# Patient Record
Sex: FEMALE | Race: BLACK OR AFRICAN AMERICAN | NOT HISPANIC OR LATINO | Employment: FULL TIME | URBAN - METROPOLITAN AREA
[De-identification: names, ages, dates, MRNs, and addresses within clinical notes are randomized per-mention and may not be internally consistent; named-entity substitution may affect disease eponyms.]

---

## 2017-01-20 ENCOUNTER — HOSPITAL ENCOUNTER (EMERGENCY)
Facility: HOSPITAL | Age: 27
Discharge: HOME | End: 2017-01-20
Attending: EMERGENCY MEDICINE
Payer: COMMERCIAL

## 2017-01-20 VITALS
WEIGHT: 155 LBS | TEMPERATURE: 98 F | HEART RATE: 87 BPM | HEIGHT: 60 IN | RESPIRATION RATE: 18 BRPM | SYSTOLIC BLOOD PRESSURE: 130 MMHG | BODY MASS INDEX: 30.43 KG/M2 | OXYGEN SATURATION: 99 % | DIASTOLIC BLOOD PRESSURE: 68 MMHG

## 2017-01-20 DIAGNOSIS — B02.8: Primary | ICD-10-CM

## 2017-01-20 LAB
B-HCG UR QL: NEGATIVE
CTP QC/QA: YES

## 2017-01-20 PROCEDURE — 25000003 PHARM REV CODE 250: Performed by: PHYSICIAN ASSISTANT

## 2017-01-20 PROCEDURE — 99284 EMERGENCY DEPT VISIT MOD MDM: CPT | Mod: 25

## 2017-01-20 PROCEDURE — 81025 URINE PREGNANCY TEST: CPT

## 2017-01-20 RX ORDER — GABAPENTIN 100 MG/1
100 CAPSULE ORAL 3 TIMES DAILY
Qty: 30 CAPSULE | Refills: 0 | Status: SHIPPED | OUTPATIENT
Start: 2017-01-20 | End: 2018-07-05

## 2017-01-20 RX ORDER — VALACYCLOVIR HYDROCHLORIDE 1 G/1
1000 TABLET, FILM COATED ORAL 3 TIMES DAILY
Qty: 21 TABLET | Refills: 0 | Status: SHIPPED | OUTPATIENT
Start: 2017-01-20 | End: 2018-07-05

## 2017-01-20 RX ORDER — HYDROCODONE BITARTRATE AND ACETAMINOPHEN 5; 325 MG/1; MG/1
1 TABLET ORAL EVERY 4 HOURS PRN
Qty: 12 TABLET | Refills: 0 | Status: SHIPPED | OUTPATIENT
Start: 2017-01-20 | End: 2017-01-30

## 2017-01-20 RX ORDER — PROPARACAINE HYDROCHLORIDE 5 MG/ML
2 SOLUTION/ DROPS OPHTHALMIC
Status: COMPLETED | OUTPATIENT
Start: 2017-01-20 | End: 2017-01-20

## 2017-01-20 RX ADMIN — PROPARACAINE HYDROCHLORIDE 2 DROP: 5 SOLUTION/ DROPS OPHTHALMIC at 10:01

## 2017-01-20 RX ADMIN — FLUORESCEIN SODIUM 1 STRIP: 1 STRIP OPHTHALMIC at 10:01

## 2017-01-20 NOTE — ED TRIAGE NOTES
Pt presented to ED with complaints of left eye pain rated 6/10 since Tuesday and rash to eye. Few small bumps noted to left side close to bridge of nose. Pt also stated that she noticed small bump to lip this am

## 2017-01-20 NOTE — ED AVS SNAPSHOT
[facility]  [address]  [place] [address]               Malissa Bunch   2017 10:00 AM   ED    Description:  Female : 1990   Department:  [facility]           Your Care was Coordinated By:     Provider Role From To    Yas Smith MD Attending Provider 17 [phone] --    DANNY Claire Physician Assistant 17 [phone] --      Reason for Visit     Eye Problem           Diagnoses this Visit        Comments    Herpes zoster with other specified complications    -  Primary       ED Disposition     ED Disposition Condition Comment    Discharge             To Do List           Follow-up Information     Follow up with Marilou Bernal MD.    Specialty:  Ophthalmology    Contact information:    [address]  [address]  [facility] [address] [phone]          Schedule an appointment as soon as possible for a visit with Trino Ron - Ophthalmology.    Specialty:  Ophthalmology    Contact information:    [address]  [facility] [address] [phone]    Additional information:    10th Floor    Dr. Kaplan patients please go to the 1st Floor [facility] for your appointment.        These Medications        Disp Refills Start End    valacyclovir (VALTREX) 1000 MG tablet 21 tablet 0 2017    Take 1 tablet (1,000 mg total) by mouth 3 (three) times daily. - Oral    hydrocodone-acetaminophen 5-325mg (NORCO) 5-325 mg per tablet 12 tablet 0 2017    Take 1 tablet by mouth every 4 (four) hours as needed. - Oral    gabapentin (NEURONTIN) 100 MG capsule 30 capsule 0 2017    Take 1 capsule (100 mg total) by mouth 3 (three) times daily. - Oral      [facility]     [facility] On Call Nurse Care Line -  Assistance  Registered nurses in the [facility] On Call Center provide clinical advisement, health education, appointment booking, and other advisory services.  Call for this free  service at [phone].             Medications           Message regarding Medications     Verify the changes and/or additions to your medication regime listed below are the same as discussed with your clinician today.  If any of these changes or additions are incorrect, please notify your healthcare provider.        START taking these NEW medications        Refills    valacyclovir (VALTREX) 1000 MG tablet 0    Sig: Take 1 tablet (1,000 mg total) by mouth 3 (three) times daily.    Class: Print    Route: Oral    hydrocodone-acetaminophen 5-325mg (NORCO) 5-325 mg per tablet 0    Sig: Take 1 tablet by mouth every 4 (four) hours as needed.    Class: Print    Route: Oral    gabapentin (NEURONTIN) 100 MG capsule 0    Sig: Take 1 capsule (100 mg total) by mouth 3 (three) times daily.    Class: Print    Route: Oral      These medications were administered today        Dose Freq    fluorescein ophthalmic strip 1 strip 1 strip ED 1 Time    Sig: Place 1 strip into the left eye ED 1 Time.    Class: Normal    Route: Left Eye    Cosign for Ordering: Required by Yas Smith MD    proparacaine 0.5 % ophthalmic solution 2 drop 2 drop ED 1 Time    Sig: Place 2 drops into the left eye ED 1 Time.    Class: Normal    Route: Left Eye    Cosign for Ordering: Required by Yas Smith MD           Verify that the below list of medications is an accurate representation of the medications you are currently taking.  If none reported, the list may be blank. If incorrect, please contact your healthcare provider. Carry this list with you in case of emergency.           Current Medications     gabapentin (NEURONTIN) 100 MG capsule Take 1 capsule (100 mg total) by mouth 3 (three) times daily.    hydrocodone-acetaminophen 5-325mg (NORCO) 5-325 mg per tablet Take 1 tablet by mouth every 4 (four) hours as needed.    valacyclovir (VALTREX) 1000 MG tablet Take 1 tablet (1,000 mg total) by mouth 3 (three) times daily.           Clinical Reference  Information           Your Vitals Were     BP Pulse Temp Resp Height Weight    130/68 87 98.4 °F (36.9 °C) 18 5' (1.524 m) 70.3 kg (155 lb)    SpO2 BMI             99% 30.27 kg/m2         Allergies as of 1/20/2017     No Known Allergies      Immunizations Administered on Date of Encounter - 1/20/2017     None      ED Micro, Lab, POCT     Start Ordered       Status Ordering Provider    01/20/17 0000 01/20/17 1017  POCT urine pregnancy     Comments:  This order was created through External Result Entry    Completed       ED Imaging Orders     None        Discharge Instructions         Shingles (Herpes Zoster)  Shingles is also called herpes zoster. It is a painful skin rash caused by the herpes zoster virus. This is the same virus that causes chickenpox. After a person has chickenpox, the virus remains inactive in the nerve cells. Years later, the virus can become active again and travel to the skin. Most people have shingles only once, but it is possible to have it more than once.  What are the risk factors for shingles?  Anyone who has had chickenpox can develop shingles. But your risk is greater if you:  · Are 50 years of age or older.  · Have an illness that weakens your immune system, such as HIV/AIDS.  · Have cancer, especially Hodgkin disease or lymphoma.  · Take medications that weaken your immune system.  What are the symptoms of shingles?     The shingles rash usually appears on just one side of the body.   · The first sign of shingles is usually pain, burning, tingling, or itching on one part of your face or body. You may also feel as if you have the flu, with fever and chills.  · A red rash with small blisters appears within a few days. The rash may appear as follows:   ¨  The blisters can occur anywhere, but theyre most common on the back, chest, or abdomen.  ¨ They usually appear on only one side of the body, spreading along the nerve pathway where the virus was inactive.   ¨ The rash can also form  around an eye, along one side of the face or neck, or in the mouth.  ¨ In a few people, usually those with weakened immune systems, shingles appear on more than one part of the body at once.  · After a few days, the blisters become dry and form a crust. The crust falls off in days to weeks. The blisters generally do not leave scars.  How is shingles treated?  For most people, shingles heals on its own in a few weeks. But treatment is recommended to help relieve pain, speed healing, and reduce the risk of complications. Antiviral medications are prescribed within the first 72 hours of the appearance of the rash. To lessen symptoms:  · Apply ice packs (wrapped in a thin towel), cool compresses,  or soak in a cool bath.  · Use calamine lotion to calm itchy skin.  · Ask your health care provider about over-the-counter pain relievers. If your pain is severe, your provider may prescribe stronger pain medications.  What are the complications of shingles?  Shingles often goes away with no lasting effects. But some people have serious problems long after the blisters have healed:  · Postherpetic neuralgia. This is severe nerve pain that lasts for months, or even years after you have shingles. Medications can be prescribed to help relieve the pain and improve quality of life.  · Bacterial infection. Shingles blisters may become infected with bacteria. Antibiotic medication is used to treat the infection.  · Eye problems. A person with shingles on the face should see his or her health care provider right away. Shingles can cause serious problems with vision, and even blindness.  When to seek medical care  Contact your health care provider if you experience any of the following:  · Symptoms that dont go away with treatment.  · A rash or blisters near your eye.  · Increased drainage, fever, or rash after treatment, or severe pain that doesnt go away.   How can shingles be prevented?  You can only get shingles if you have had  chicken pox in the past. Those who have never had chickenpox can get the virus from you. Although instead of developing shingles, the person may get chickenpox. Until your blisters form scabs, avoid contact with others, especially the following:  · Pregnant women who have never had chickenpox or the vaccine  · Infants who were born early (prematurely) or who had low weight at birth  · People with weak immune system (for example, people receiving chemotherapy for cancer, people who have had organ transplants, or people with HIV infections)     The shingles vaccine  If youre 60 years of age or older , ask your health care provider if you should receive the shingles vaccine. The vaccine makes it less likely that you will develop shingles. If you do develop shingles, your symptoms will likely be milder than if you hadnt been vaccinated. Note: Although the vaccine is licensed for people 50 years of age or older, the CDC does not recommend the vaccine for those who are 50 to 59 years old.   © 2257-1078 [employer] [address] [place] [address]. All rights reserved. This information is not intended as a substitute for professional medical care. Always follow your healthcare professional's instructions.           [facility] complies with applicable Federal civil rights laws and does not discriminate on the basis of race, color, national origin, age, disability, or sex.        Language Assistance Services     ATTENTION: Language assistance services are available, free of charge. Please call [phone].      ATENCIÓN: Si habla krunal, tiene a buchanan disposición servicios gratuitos de asistencia lingüística. Llame al [phone].     CHÚ Ý: N?u b?n nói Ti?ng Vi?t, có các d?ch v? h? tr? ngôn ng? mi?n phí dành cho b?n. G?i s? [phone].

## 2017-01-20 NOTE — ED PROVIDER NOTES
Encounter Date: 1/20/2017       History     Chief Complaint   Patient presents with    Eye Problem     left eye irritation, denies visual disturbance     Review of patient's allergies indicates:  No Known Allergies  HPI Comments: Malissa Bunch 26 y.o. nontoxic/afebrile female with no reported PMH  presented to the ED with C/o left facial pain for the past few days. She states that pain was initially a mild discomfit that has gradually worsened. She reports that today she noted painful clear blisters to the affected area. She denies any vision changes, vision loss, or eye pain. She denies any fever, chills, ear pain, ear drainage, neck pain or other locations at this time.  She does report having chicken pox as a child. She denies truing any medications for the symptoms.     The history is provided by the patient.     History reviewed. No pertinent past medical history.  No past medical history pertinent negatives.  Past Surgical History   Procedure Laterality Date    Scoliosis sx       History reviewed. No pertinent family history.  Social History   Substance Use Topics    Smoking status: Never Smoker    Smokeless tobacco: None    Alcohol use Yes     Review of Systems   Constitutional: Negative for activity change, appetite change, chills and fever.   HENT: Negative for congestion, postnasal drip, rhinorrhea, sinus pressure and sore throat.    Eyes: Negative for photophobia, pain, discharge, redness and visual disturbance.   Respiratory: Negative for shortness of breath.    Cardiovascular: Negative for chest pain.   Gastrointestinal: Negative for nausea and vomiting.   Genitourinary: Negative for dysuria.   Musculoskeletal: Negative for arthralgias, back pain, myalgias, neck pain and neck stiffness.   Skin: Positive for rash.        Painful rash to the left upper eyelid, left cheek and left upper lip   Neurological: Negative for dizziness, weakness, light-headedness, numbness and headaches.   Hematological:  Does not bruise/bleed easily.       Physical Exam   Initial Vitals   BP Pulse Resp Temp SpO2   01/20/17 0930 01/20/17 0930 01/20/17 0930 01/20/17 0930 01/20/17 0930   130/68 87 18 98.4 °F (36.9 °C) 99 %     Physical Exam    Nursing note and vitals reviewed.  Constitutional: Vital signs are normal. She appears well-developed and well-nourished. She is cooperative.  Non-toxic appearance. She does not appear ill. No distress.   HENT:   Head: Normocephalic and atraumatic.       Right Ear: Tympanic membrane and ear canal normal.   Left Ear: Tympanic membrane and ear canal normal.   Mouth/Throat: Oropharynx is clear and moist and mucous membranes are normal.   Sporadic vesicular rash in dermatomal distribution to the left V2 region of the trigeminal nerve.  No rodriguez hunt syndrome or occular involvement at this time.    Eyes: Conjunctivae, EOM and lids are normal. Pupils are equal, round, and reactive to light.   Slit lamp exam:       The left eye shows no fluorescein uptake.   No dendrites noted   Neck: Neck supple. No rigidity.   Cardiovascular: Normal rate and regular rhythm.   Pulmonary/Chest: Breath sounds normal. No respiratory distress. She has no wheezes. She has no rhonchi.   Abdominal: Soft. Normal appearance and bowel sounds are normal. There is no tenderness. There is no rigidity and no guarding.   Musculoskeletal: Normal range of motion.   Neurological: She is alert and oriented to person, place, and time. No sensory deficit. Gait normal. GCS eye subscore is 4. GCS verbal subscore is 5. GCS motor subscore is 6.   Skin: Skin is warm, dry and intact. Rash noted. Rash is vesicular.   Psychiatric: She has a normal mood and affect. Her speech is normal and behavior is normal. Thought content normal.         ED Course   Procedures  Labs Reviewed - No data to display     Malissa Bunch 26 y.o. nontoxic/afebrile female with no reported PMH  presented to the ED with C/o left facial pain for the past few days. She  states that pain was initially a mild discomfit that has gradually worsened. She reports that today she noted painful clear blisters to the affected area. She denies any vision changes, vision loss, or eye pain. She denies any fever, chills, ear pain, ear drainage, neck pain or other locations at this time.  She does report having chicken pox as a child. She denies truing any medications for the symptoms.  ROS positive for rash to the left face.  Physical exam reveals patient well appearing in no obvious distress. Sporadic vesicular rash in dermatomal distribution to the left V2 region of the trigeminal nerve.  No rodriguez hunt syndrome or occular involvement at this time. Ear canal and TM's normal, PERRL, EOMs intact. FROm of neck and all extremities with strength 5.5 bilaterally.     DDX: herpes zoster, herpes ocularis    ED management: remains well appearing and will send home with valtrex and pain control. Eye stained in the ED with no abnormality at this time. Vision intact    Impression/Plan: The encounter diagnosis was Herpes zoster with other specified complications. Discharged with valtrex, norco and Neurontin. Patient will follow up with Primary.  Patient cautioned on when to return to ED.  Pt. Understands and agrees with current treatment plan                    Attending Attestation:     Physician Attestation Statement for NP/PA:   I have conducted a face to face encounter with this patient in addition to the NP/PA, due to    Other NP/PA Attestation Additions:      Medical Decision Making: Patient presents with new onset shingles to her face.  She has new vesicular lesions in the V2 distribution.  There is no eye involvement at this time.  The patient will be discharged with prescriptions for Valtrex and gabapentin and Norco.  She will follow up with ophthalmology if she starts having eye pain                 ED Course     Clinical Impression:   The encounter diagnosis was Herpes zoster with other  specified complications.          DANNY Claire  01/20/17 1622       Yas Smith MD  01/20/17 8632

## 2017-01-20 NOTE — DISCHARGE INSTRUCTIONS
Shingles (Herpes Zoster)  Shingles is also called herpes zoster. It is a painful skin rash caused by the herpes zoster virus. This is the same virus that causes chickenpox. After a person has chickenpox, the virus remains inactive in the nerve cells. Years later, the virus can become active again and travel to the skin. Most people have shingles only once, but it is possible to have it more than once.  What are the risk factors for shingles?  Anyone who has had chickenpox can develop shingles. But your risk is greater if you:  · Are 50 years of age or older.  · Have an illness that weakens your immune system, such as HIV/AIDS.  · Have cancer, especially Hodgkin disease or lymphoma.  · Take medications that weaken your immune system.  What are the symptoms of shingles?     The shingles rash usually appears on just one side of the body.   · The first sign of shingles is usually pain, burning, tingling, or itching on one part of your face or body. You may also feel as if you have the flu, with fever and chills.  · A red rash with small blisters appears within a few days. The rash may appear as follows:   ¨  The blisters can occur anywhere, but theyre most common on the back, chest, or abdomen.  ¨ They usually appear on only one side of the body, spreading along the nerve pathway where the virus was inactive.   ¨ The rash can also form around an eye, along one side of the face or neck, or in the mouth.  ¨ In a few people, usually those with weakened immune systems, shingles appear on more than one part of the body at once.  · After a few days, the blisters become dry and form a crust. The crust falls off in days to weeks. The blisters generally do not leave scars.  How is shingles treated?  For most people, shingles heals on its own in a few weeks. But treatment is recommended to help relieve pain, speed healing, and reduce the risk of complications. Antiviral medications are prescribed within the first 72 hours of  the appearance of the rash. To lessen symptoms:  · Apply ice packs (wrapped in a thin towel), cool compresses,  or soak in a cool bath.  · Use calamine lotion to calm itchy skin.  · Ask your health care provider about over-the-counter pain relievers. If your pain is severe, your provider may prescribe stronger pain medications.  What are the complications of shingles?  Shingles often goes away with no lasting effects. But some people have serious problems long after the blisters have healed:  · Postherpetic neuralgia. This is severe nerve pain that lasts for months, or even years after you have shingles. Medications can be prescribed to help relieve the pain and improve quality of life.  · Bacterial infection. Shingles blisters may become infected with bacteria. Antibiotic medication is used to treat the infection.  · Eye problems. A person with shingles on the face should see his or her health care provider right away. Shingles can cause serious problems with vision, and even blindness.  When to seek medical care  Contact your health care provider if you experience any of the following:  · Symptoms that dont go away with treatment.  · A rash or blisters near your eye.  · Increased drainage, fever, or rash after treatment, or severe pain that doesnt go away.   How can shingles be prevented?  You can only get shingles if you have had chicken pox in the past. Those who have never had chickenpox can get the virus from you. Although instead of developing shingles, the person may get chickenpox. Until your blisters form scabs, avoid contact with others, especially the following:  · Pregnant women who have never had chickenpox or the vaccine  · Infants who were born early (prematurely) or who had low weight at birth  · People with weak immune system (for example, people receiving chemotherapy for cancer, people who have had organ transplants, or people with HIV infections)     The shingles vaccine  If youre 60 years of  age or older , ask your health care provider if you should receive the shingles vaccine. The vaccine makes it less likely that you will develop shingles. If you do develop shingles, your symptoms will likely be milder than if you hadnt been vaccinated. Note: Although the vaccine is licensed for people 50 years of age or older, the CDC does not recommend the vaccine for those who are 50 to 59 years old.   © 9661-0454 [employer] [address] [place] [address], [place] [address]. All rights reserved. This information is not intended as a substitute for professional medical care. Always follow your healthcare professional's instructions.

## 2018-07-03 ENCOUNTER — PATIENT MESSAGE (OUTPATIENT)
Dept: UROLOGY | Facility: CLINIC | Age: 28
End: 2018-07-03

## 2018-07-03 ENCOUNTER — OFFICE VISIT (OUTPATIENT)
Dept: UROLOGY | Facility: CLINIC | Age: 28
End: 2018-07-03
Payer: MEDICAID

## 2018-07-03 VITALS
BODY MASS INDEX: 30.43 KG/M2 | SYSTOLIC BLOOD PRESSURE: 97 MMHG | WEIGHT: 155 LBS | HEART RATE: 75 BPM | OXYGEN SATURATION: 98 % | DIASTOLIC BLOOD PRESSURE: 65 MMHG | RESPIRATION RATE: 17 BRPM | HEIGHT: 60 IN

## 2018-07-03 DIAGNOSIS — N39.41 URGE INCONTINENCE: Primary | ICD-10-CM

## 2018-07-03 DIAGNOSIS — Z87.440 HISTORY OF RECURRENT UTI (URINARY TRACT INFECTION): ICD-10-CM

## 2018-07-03 DIAGNOSIS — R35.1 NOCTURIA: ICD-10-CM

## 2018-07-03 DIAGNOSIS — R39.15 URINARY URGENCY: ICD-10-CM

## 2018-07-03 LAB
BILIRUB SERPL-MCNC: NORMAL MG/DL
BLOOD URINE, POC: NORMAL
COLOR, POC UA: YELLOW
GLUCOSE UR QL STRIP: NORMAL
KETONES UR QL STRIP: NORMAL
LEUKOCYTE ESTERASE URINE, POC: NORMAL
NITRITE, POC UA: NORMAL
PH, POC UA: 6
PROTEIN, POC: NORMAL
SPECIFIC GRAVITY, POC UA: 1.03
UROBILINOGEN, POC UA: 0.2

## 2018-07-03 PROCEDURE — 99999 PR PBB SHADOW E&M-EST. PATIENT-LVL IV: CPT | Mod: PBBFAC,,, | Performed by: NURSE PRACTITIONER

## 2018-07-03 PROCEDURE — 99204 OFFICE O/P NEW MOD 45 MIN: CPT | Mod: S$PBB,,, | Performed by: NURSE PRACTITIONER

## 2018-07-03 PROCEDURE — 81002 URINALYSIS NONAUTO W/O SCOPE: CPT | Mod: PBBFAC,PO | Performed by: NURSE PRACTITIONER

## 2018-07-03 PROCEDURE — 99214 OFFICE O/P EST MOD 30 MIN: CPT | Mod: PBBFAC,PO | Performed by: NURSE PRACTITIONER

## 2018-07-03 RX ORDER — SOLIFENACIN SUCCINATE 5 MG/1
5 TABLET, FILM COATED ORAL DAILY
Qty: 60 TABLET | Refills: 0 | Status: SHIPPED | OUTPATIENT
Start: 2018-07-03 | End: 2018-07-03 | Stop reason: ALTCHOICE

## 2018-07-03 RX ORDER — OXYBUTYNIN CHLORIDE 5 MG/1
5 TABLET, EXTENDED RELEASE ORAL DAILY
Qty: 30 TABLET | Refills: 1 | Status: SHIPPED | OUTPATIENT
Start: 2018-07-03 | End: 2018-08-14 | Stop reason: SDUPTHER

## 2018-07-03 NOTE — PATIENT INSTRUCTIONS
1. Urine dipstick  2. Start trial of oxybutynin 5 mg daily  3. May consider urodynamics in the future with Dr. Clark if medication therapy is unsuccessful.   4. Follow-up in 6 weeks for re-evaluation

## 2018-07-03 NOTE — PROGRESS NOTES
Subjective:       Patient ID: Malissa Bunch is a 27 y.o. female.    Chief Complaint: Urinary Urgency (and urge incontinence)    Patient is a 26 yo AAF who is new to me. She is here today for urinary urgency and urge incontinence. Patient has a history of recurrent UTIs. She reports a recent UTI last month and was treated with bactrim ds. Patient does not have any children, but has been pregnant once (miscarriage).      Other   This is a chronic (Urge incontinence/OAB) problem. Episode onset: 1 year ago. The problem occurs daily. The problem has been unchanged. Associated symptoms include headaches and urinary symptoms (urge incontinence/OAB, nocturia x2). Pertinent negatives include no abdominal pain, anorexia, arthralgias, change in bowel habit, chest pain, chills, congestion, coughing, diaphoresis, fatigue, fever, myalgias, nausea, neck pain, numbness, rash, sore throat, swollen glands, vertigo, visual change, vomiting or weakness. Nothing aggravates the symptoms. She has tried nothing for the symptoms.     Review of Systems   Constitutional: Negative for appetite change, chills, diaphoresis, fatigue and fever.   HENT: Negative for congestion and sore throat.    Respiratory: Negative for cough.    Cardiovascular: Negative for chest pain.   Gastrointestinal: Negative for abdominal pain, anorexia, blood in stool, change in bowel habit, constipation, diarrhea, nausea and vomiting.   Genitourinary: Positive for pelvic pain and urgency. Negative for decreased urine volume, difficulty urinating, dysuria, flank pain, frequency, hematuria, menstrual problem, vaginal bleeding, vaginal discharge and vaginal pain.   Musculoskeletal: Negative.  Negative for arthralgias, myalgias and neck pain.   Skin: Negative for rash.   Neurological: Positive for headaches. Negative for dizziness, vertigo, weakness, light-headedness and numbness.   Psychiatric/Behavioral: Negative.        Objective:      Physical Exam   Constitutional: She  is oriented to person, place, and time. She appears well-developed and well-nourished.   HENT:   Head: Normocephalic and atraumatic.   Eyes: EOM are normal. Pupils are equal, round, and reactive to light.   Neck: Normal range of motion.   Cardiovascular: Normal rate.    Pulmonary/Chest: Effort normal. No respiratory distress.   Abdominal: Soft. There is no tenderness.   Musculoskeletal: Normal range of motion. She exhibits no edema.   Lymphadenopathy:     She has no cervical adenopathy.   Neurological: She is alert and oriented to person, place, and time. Coordination normal.   Skin: Skin is warm and dry.   Psychiatric: She has a normal mood and affect. Her behavior is normal. Judgment and thought content normal.   Nursing note and vitals reviewed.      Assessment:       1. Urge incontinence    2. Urinary urgency    3. Nocturia    4. History of recurrent UTI (urinary tract infection)        Plan:       Malissa was seen today for urinary urgency.    Diagnoses and all orders for this visit:    Urge incontinence  -     POCT URINE DIPSTICK WITHOUT MICROSCOPE  -     Discontinue: solifenacin (VESICARE) 5 MG tablet; Take 1 tablet (5 mg total) by mouth once daily. Not covered by insurance.   -     oxybutynin (DITROPAN-XL) 5 MG TR24; Take 1 tablet (5 mg total) by mouth once daily.    Urinary urgency  -     oxybutynin (DITROPAN-XL) 5 MG TR24; Take 1 tablet (5 mg total) by mouth once daily.    Nocturia  -     POCT URINE DIPSTICK WITHOUT MICROSCOPE    History of recurrent UTI (urinary tract infection)  -     POCT URINE DIPSTICK WITHOUT MICROSCOPE    Other orders  1. Start trial of oxybutynin 5 mg daily  2. May consider urodynamics in the future with Dr. Clark if medication therapy is unsuccessful.     Follow-up in 6 weeks for re-evaluation.    Bernadine Mari NP

## 2018-07-05 ENCOUNTER — LAB VISIT (OUTPATIENT)
Dept: LAB | Facility: OTHER | Age: 28
End: 2018-07-05
Attending: OBSTETRICS & GYNECOLOGY
Payer: MEDICAID

## 2018-07-05 ENCOUNTER — PATIENT MESSAGE (OUTPATIENT)
Dept: OBSTETRICS AND GYNECOLOGY | Facility: CLINIC | Age: 28
End: 2018-07-05

## 2018-07-05 ENCOUNTER — OFFICE VISIT (OUTPATIENT)
Dept: OBSTETRICS AND GYNECOLOGY | Facility: CLINIC | Age: 28
End: 2018-07-05
Payer: MEDICAID

## 2018-07-05 ENCOUNTER — TELEPHONE (OUTPATIENT)
Dept: OBSTETRICS AND GYNECOLOGY | Facility: CLINIC | Age: 28
End: 2018-07-05

## 2018-07-05 VITALS
SYSTOLIC BLOOD PRESSURE: 100 MMHG | HEIGHT: 62 IN | DIASTOLIC BLOOD PRESSURE: 62 MMHG | WEIGHT: 167.13 LBS | BODY MASS INDEX: 30.76 KG/M2

## 2018-07-05 DIAGNOSIS — Z30.09 ENCOUNTER FOR OTHER GENERAL COUNSELING OR ADVICE ON CONTRACEPTION: ICD-10-CM

## 2018-07-05 DIAGNOSIS — Z20.2 POSSIBLE EXPOSURE TO STD: ICD-10-CM

## 2018-07-05 DIAGNOSIS — Z01.411 ENCOUNTER FOR GYNECOLOGICAL EXAMINATION (GENERAL) (ROUTINE) WITH ABNORMAL FINDINGS: Primary | ICD-10-CM

## 2018-07-05 DIAGNOSIS — R87.611 PAP SMEAR OF CERVIX WITH ASCUS, CANNOT EXCLUDE HGSIL: ICD-10-CM

## 2018-07-05 DIAGNOSIS — N87.0 DYSPLASIA OF CERVIX, LOW GRADE (CIN 1): ICD-10-CM

## 2018-07-05 DIAGNOSIS — N89.8 VAGINAL DISCHARGE: ICD-10-CM

## 2018-07-05 LAB
CANDIDA RRNA VAG QL PROBE: NEGATIVE
G VAGINALIS RRNA GENITAL QL PROBE: POSITIVE
T VAGINALIS RRNA GENITAL QL PROBE: NEGATIVE

## 2018-07-05 PROCEDURE — 99385 PREV VISIT NEW AGE 18-39: CPT | Mod: S$PBB,,, | Performed by: OBSTETRICS & GYNECOLOGY

## 2018-07-05 PROCEDURE — 86592 SYPHILIS TEST NON-TREP QUAL: CPT

## 2018-07-05 PROCEDURE — 36415 COLL VENOUS BLD VENIPUNCTURE: CPT

## 2018-07-05 PROCEDURE — 99212 OFFICE O/P EST SF 10 MIN: CPT | Mod: PBBFAC | Performed by: OBSTETRICS & GYNECOLOGY

## 2018-07-05 PROCEDURE — 87491 CHLMYD TRACH DNA AMP PROBE: CPT

## 2018-07-05 PROCEDURE — 88175 CYTOPATH C/V AUTO FLUID REDO: CPT | Performed by: PATHOLOGY

## 2018-07-05 PROCEDURE — 87510 GARDNER VAG DNA DIR PROBE: CPT

## 2018-07-05 PROCEDURE — 87480 CANDIDA DNA DIR PROBE: CPT

## 2018-07-05 PROCEDURE — 86703 HIV-1/HIV-2 1 RESULT ANTBDY: CPT

## 2018-07-05 PROCEDURE — 88141 CYTOPATH C/V INTERPRET: CPT | Mod: ,,, | Performed by: PATHOLOGY

## 2018-07-05 PROCEDURE — 99999 PR PBB SHADOW E&M-EST. PATIENT-LVL II: CPT | Mod: PBBFAC,,, | Performed by: OBSTETRICS & GYNECOLOGY

## 2018-07-05 RX ORDER — METRONIDAZOLE 500 MG/1
500 TABLET ORAL EVERY 12 HOURS
Qty: 14 TABLET | Refills: 0 | Status: SHIPPED | OUTPATIENT
Start: 2018-07-05 | End: 2018-07-12

## 2018-07-05 NOTE — TELEPHONE ENCOUNTER
Returning patients call. Informed patient that I have spoken to Dr Paulino and he has sent the Rx to pharmacy. verbalized understanding.

## 2018-07-05 NOTE — TELEPHONE ENCOUNTER
----- Message from Ej Julian sent at 7/5/2018  3:45 PM CDT -----  Contact: self  Pt called wanting to know when her Rx would be sent into the pharmacy. She can be reached at [phone]

## 2018-07-05 NOTE — PROGRESS NOTES
Chief Complaint   Patient presents with    Annual Exam       HPI:  27 y.o. female  presents as a new patient for a well woman exam    Patient's last menstrual period was 2018.    - History of abnormal paps: 2015: ASC-H pap at outside facility --> 3/2016: Bx=CIN1 at [facility]  - Abnormal bleeding: DENIES  - Family history of breast or ovarian cancer: MATERNAL GREAT GRANDMOTHER WITH BREAST CANCER  - Any breast masses, pain, skin changes, or nipple discharge: DENIES  - Possible recent STD exposure: DESIRES TESTING    - NOTES VAGINAL DISCHARGE    - INTERESTED IN STARTING CONTRACEPTION    Contraception: NONE  Pap: 7/10/2018, AS ABOVE    Past Medical History:   Diagnosis Date    Abnormal Pap smear of cervix     Urinary tract infection     Vaginal infection      Past Surgical History:   Procedure Laterality Date    scoliosis sx         Social History   Substance Use Topics    Smoking status: Never Smoker    Smokeless tobacco: Never Used    Alcohol use Yes     Family History   Problem Relation Age of Onset    Kidney disease Neg Hx      OB History    Para Term  AB Living   2       1     SAB TAB Ectopic Multiple Live Births         0        # Outcome Date GA Lbr Evelio/2nd Weight Sex Delivery Anes PTL Lv   2             1 AB                   MEDICATIONS: Reviewed with patient.  ALLERGIES: Patient has no known allergies.     ROS:  Review of Systems   Constitutional: Negative for fever.   Respiratory: Negative for shortness of breath.    Cardiovascular: Negative for chest pain.   Gastrointestinal: Negative for abdominal pain, constipation, diarrhea, nausea and vomiting.   Endocrine: Negative for hot flashes.   Genitourinary: Positive for vaginal discharge. Negative for menstrual problem and pelvic pain.   Neurological: Negative for headaches.   Hematological: Does not bruise/bleed easily.   Psychiatric/Behavioral: Negative for depression.   Breast: Negative for breast mass, breast pain,  "nipple discharge and skin changes      PHYSICAL EXAM:    /62   Ht 5' 2" (1.575 m)   Wt 75.8 kg (167 lb 1.7 oz)   LMP 06/21/2018   BMI 30.56 kg/m²     Physical Exam:   Constitutional: She is oriented to person, place, and time. She appears well-developed.   No fever    HENT:   Head: Normocephalic.     Neck: No thyromegaly present.    Cardiovascular: Normal rate.     Pulmonary/Chest: Effort normal. Right breast exhibits no mass, no nipple discharge, no skin change, no tenderness and no swelling. Left breast exhibits no mass, no nipple discharge, no skin change, no tenderness and no swelling. Breasts are symmetrical.        Abdominal: She exhibits no mass. There is no hepatosplenomegaly. There is no tenderness.     Genitourinary: Uterus is not enlarged and not tender. Cervix is normal. Right adnexum displays no tenderness and no fullness. Left adnexum displays no tenderness and no fullness. Vaginal discharge found. Additional cervical findings: pap smear done  Genitourinary Comments: External genitalia: Normal  Urethra: No tenderness; normal meatus  Bladder: No tenderness              Lymphadenopathy:     She has no cervical adenopathy.    Neurological: She is alert and oriented to person, place, and time.     Psychiatric: She has a normal mood and affect.         ASSESSMENT & PLAN:   Encounter for gynecological examination (general) (routine) with abnormal findings  -     Liquid-based pap smear, screening    Vaginal discharge  -     Vaginosis Screen by DNA Probe  -     metroNIDAZOLE (FLAGYL) 500 MG tablet; Take 1 tablet (500 mg total) by mouth every 12 (twelve) hours. for 7 days  Dispense: 14 tablet; Refill: 0    Possible exposure to STD  -     C. trachomatis/N. gonorrhoeae by AMP DNA Cervix  -     HIV-1 and HIV-2 antibodies; Future; Expected date: 07/05/2018  -     RPR; Future; Expected date: 07/05/2018    Encounter for other general counseling or advice on contraception    Pap smear of cervix with ASCUS, " cannot exclude HGSIL  -     Liquid-based pap smear, screening    Dysplasia of cervix, low grade (DIMAS 1)  -     Liquid-based pap smear, screening        - Breast and pelvic exam: NORMAL BREAST EXAM.  NORMAL PELVIC EXAM EXCEPT VAGINAL DISCHARGE  - Patient was counseled on ASCCP guidelines for cervical cytology screening  - Cervical screening: PAP DONE TODAY  - STD testing: AS ABOVE  - Contraception: FILED PPW FOR NEXPLANON    - CHECK VAGINOSIS SCREEN  - TREAT EMPIRICALLY WITH FLAGYL

## 2018-07-06 LAB
C TRACH DNA SPEC QL NAA+PROBE: NOT DETECTED
HIV 1+2 AB+HIV1 P24 AG SERPL QL IA: NEGATIVE
N GONORRHOEA DNA SPEC QL NAA+PROBE: NOT DETECTED
RPR SER QL: NORMAL

## 2018-07-17 ENCOUNTER — TELEPHONE (OUTPATIENT)
Dept: OBSTETRICS AND GYNECOLOGY | Facility: CLINIC | Age: 28
End: 2018-07-17

## 2018-07-19 ENCOUNTER — PATIENT MESSAGE (OUTPATIENT)
Dept: OBSTETRICS AND GYNECOLOGY | Facility: CLINIC | Age: 28
End: 2018-07-19

## 2018-07-19 DIAGNOSIS — N89.8 VAGINAL DISCHARGE: Primary | ICD-10-CM

## 2018-07-20 ENCOUNTER — TELEPHONE (OUTPATIENT)
Dept: OBSTETRICS AND GYNECOLOGY | Facility: CLINIC | Age: 28
End: 2018-07-20

## 2018-07-20 RX ORDER — FLUCONAZOLE 150 MG/1
150 TABLET ORAL ONCE
Qty: 1 TABLET | Refills: 0 | Status: SHIPPED | OUTPATIENT
Start: 2018-07-20 | End: 2018-07-20

## 2018-08-13 ENCOUNTER — TELEPHONE (OUTPATIENT)
Dept: OBSTETRICS AND GYNECOLOGY | Facility: CLINIC | Age: 28
End: 2018-08-13

## 2018-08-13 NOTE — TELEPHONE ENCOUNTER
----- Message from Valarie Loya sent at 8/13/2018  2:30 PM CDT -----  Contact: Patient   Patient called regarding scheduling a colposcopy with . The patient can be reached at [phone].

## 2018-08-14 ENCOUNTER — OFFICE VISIT (OUTPATIENT)
Dept: UROLOGY | Facility: CLINIC | Age: 28
End: 2018-08-14
Payer: MEDICAID

## 2018-08-14 VITALS
RESPIRATION RATE: 19 BRPM | SYSTOLIC BLOOD PRESSURE: 100 MMHG | HEIGHT: 62 IN | OXYGEN SATURATION: 99 % | WEIGHT: 167 LBS | BODY MASS INDEX: 30.73 KG/M2 | HEART RATE: 71 BPM | DIASTOLIC BLOOD PRESSURE: 62 MMHG

## 2018-08-14 DIAGNOSIS — R39.15 URINARY URGENCY: ICD-10-CM

## 2018-08-14 DIAGNOSIS — N39.41 URGE INCONTINENCE: ICD-10-CM

## 2018-08-14 PROCEDURE — 99214 OFFICE O/P EST MOD 30 MIN: CPT | Mod: PBBFAC,PO | Performed by: NURSE PRACTITIONER

## 2018-08-14 PROCEDURE — 99999 PR PBB SHADOW E&M-EST. PATIENT-LVL IV: CPT | Mod: PBBFAC,,, | Performed by: NURSE PRACTITIONER

## 2018-08-14 PROCEDURE — 99213 OFFICE O/P EST LOW 20 MIN: CPT | Mod: S$PBB,,, | Performed by: NURSE PRACTITIONER

## 2018-08-14 RX ORDER — OXYBUTYNIN CHLORIDE 5 MG/1
5 TABLET, EXTENDED RELEASE ORAL DAILY
Qty: 30 TABLET | Refills: 11 | Status: SHIPPED | OUTPATIENT
Start: 2018-09-01 | End: 2018-08-16

## 2018-08-14 NOTE — PROGRESS NOTES
Subjective:       Patient ID: Malissa Bunch is a 28 y.o. female.    Chief Complaint: Follow-up    Patient is here today for F/U regarding her urinary incontinence and uergency. She was started on oxybutynin 5 mg at last visit on 7/3/2018. Patient denies urinary frequency, urgency, urinary incontinence, or nocturia at this time. She reports her symptoms improved 1 week after starting medication. Patient has no concerns at this time.       Other   Chronicity: Urinary urge incontinence. The current episode started more than 1 year ago. The problem has been resolved. Pertinent negatives include no abdominal pain, anorexia, arthralgias, change in bowel habit, chest pain, chills, congestion, coughing, diaphoresis, fatigue, fever, headaches, joint swelling, myalgias, nausea, neck pain, numbness, rash, sore throat, swollen glands, urinary symptoms, vertigo, visual change, vomiting or weakness. Nothing aggravates the symptoms. Treatments tried: oxybutynin 5 mg. The treatment provided significant relief.     Review of Systems   Constitutional: Negative for appetite change, chills, diaphoresis, fatigue and fever.   HENT: Negative for congestion and sore throat.    Respiratory: Negative for cough.    Cardiovascular: Negative for chest pain.   Gastrointestinal: Negative for abdominal pain, anorexia, blood in stool, change in bowel habit, constipation, diarrhea, nausea and vomiting.   Genitourinary: Negative for decreased urine volume, difficulty urinating, dysuria, flank pain, frequency, hematuria, pelvic pain, urgency, vaginal bleeding, vaginal discharge and vaginal pain.   Musculoskeletal: Negative for arthralgias, joint swelling, myalgias and neck pain.   Skin: Negative.  Negative for rash.   Neurological: Negative for dizziness, vertigo, weakness, numbness and headaches.   Psychiatric/Behavioral: Negative.        Objective:      Physical Exam   Constitutional: She is oriented to person, place, and time. She appears  well-developed and well-nourished.   HENT:   Head: Normocephalic and atraumatic.   Eyes: EOM are normal. Pupils are equal, round, and reactive to light.   Neck: Normal range of motion.   Cardiovascular: Normal rate.   Pulmonary/Chest: Effort normal. No respiratory distress.   Abdominal: Soft. There is no tenderness.   Musculoskeletal: Normal range of motion. She exhibits no edema.   Neurological: She is alert and oriented to person, place, and time. Coordination normal.   Skin: Skin is warm and dry.   Psychiatric: She has a normal mood and affect. Her behavior is normal. Judgment and thought content normal.   Nursing note and vitals reviewed.      Assessment:       1. Urge incontinence    2. Urinary urgency        Plan:     Malissa was seen today for follow-up.    Diagnoses and all orders for this visit:    Urge incontinence  -     oxybutynin (DITROPAN-XL) 5 MG TR24; Take 1 tablet (5 mg total) by mouth once daily. REFILLED    Urinary urgency  -     oxybutynin (DITROPAN-XL) 5 MG TR24; Take 1 tablet (5 mg total) by mouth once daily. REFILLED    Follow-up in 1 year and as needed.     Bernadine Mari NP

## 2018-08-16 ENCOUNTER — PROCEDURE VISIT (OUTPATIENT)
Dept: OBSTETRICS AND GYNECOLOGY | Facility: CLINIC | Age: 28
End: 2018-08-16
Payer: MEDICAID

## 2018-08-16 VITALS
WEIGHT: 167.56 LBS | BODY MASS INDEX: 30.83 KG/M2 | DIASTOLIC BLOOD PRESSURE: 80 MMHG | HEIGHT: 62 IN | SYSTOLIC BLOOD PRESSURE: 120 MMHG

## 2018-08-16 DIAGNOSIS — R87.612 LOW GRADE SQUAMOUS INTRAEPITHELIAL LESION ON CYTOLOGIC SMEAR OF CERVIX (LGSIL): Primary | ICD-10-CM

## 2018-08-16 PROCEDURE — 88305 TISSUE EXAM BY PATHOLOGIST: CPT | Mod: 26,,, | Performed by: PATHOLOGY

## 2018-08-16 PROCEDURE — 88305 TISSUE EXAM BY PATHOLOGIST: CPT | Performed by: PATHOLOGY

## 2018-08-16 PROCEDURE — 57456 ENDOCERV CURETTAGE W/SCOPE: CPT | Mod: PBBFAC,PO | Performed by: OBSTETRICS & GYNECOLOGY

## 2018-08-16 NOTE — PROCEDURES
Colposcopy  Date/Time: 2018 9:10 AM  Performed by: VICENTE Paulino MD  Authorized by: VICENTE Paulino MD     Consent Done?:  Yes (Written)    Colposcopy Site:  Cervix  Acrowhite Lesion: No    Atypical Vessels: No    Transformation Zone Adequate?: No    Biopsy?: No    ECC Performed?: Yes    LEEP Performed?: No     Patient tolerated the procedure well with no immediate complications.   Post-operative instructions were provided for the patient.   Patient was discharged and will follow up if any complications occur        28 y.o.   presents for colposcopy.    No LMP recorded.    Pap: 2015: ASC-H pap at outside facility --> 3/2016: Bx=CIN1 at [facility].  2018: low-grade squamous intraepithelial neoplasia (LGSIL - encompassing HPV,mild dysplasia,DIMAS I)    Patient was counseled on the abnormal test findings were discussed, as well as HPV infection, need for colposcopy and possible biopsies to determine the plan of care, and treatments available.    Patient was counseled on the risks of procedure including pain, bleeding, or infection.  Patient acknowledged risks, and all questions were answered.     UPT is negative.      COLPOSCOPIC EXAM    Time out performed.  Cervix visualized with speculum.  Acetic acid applied to cervix.     Findings: no visible lesions, no mosaicism, no punctation and no abnormal vasculature  ECC:  was performed  Biopsy: Not done    Monsel's: was applied    The patient tolerated the procedure well.    All collected specimens sent to pathology for histologic analysis.      POST-COLPOSCOPY COUNSELING    The patient was instructed to  - manage post-colposcopy cramping with NSAIDs or Tylenol  - avoid intercourse, douching, or tampons in the vagina for at least 2-3 days  - expect a clumpy blackish discharge due to Monsel's solution application for several days  - report heavy bleeding, worsening pain or pain that does not respond to above medications, or foul-smelling vaginal  discharge    HPV vaccine recommended according to FDA age guidelines.    Importance of follow-up stressed.

## 2018-09-09 ENCOUNTER — PATIENT MESSAGE (OUTPATIENT)
Dept: OBSTETRICS AND GYNECOLOGY | Facility: CLINIC | Age: 28
End: 2018-09-09

## 2018-09-14 ENCOUNTER — TELEPHONE (OUTPATIENT)
Dept: OBSTETRICS AND GYNECOLOGY | Facility: CLINIC | Age: 28
End: 2018-09-14

## 2018-09-17 ENCOUNTER — TELEPHONE (OUTPATIENT)
Dept: OBSTETRICS AND GYNECOLOGY | Facility: CLINIC | Age: 28
End: 2018-09-17

## 2018-09-21 ENCOUNTER — PROCEDURE VISIT (OUTPATIENT)
Dept: OBSTETRICS AND GYNECOLOGY | Facility: CLINIC | Age: 28
End: 2018-09-21
Payer: MEDICAID

## 2018-09-21 VITALS
DIASTOLIC BLOOD PRESSURE: 70 MMHG | WEIGHT: 172.81 LBS | BODY MASS INDEX: 31.8 KG/M2 | SYSTOLIC BLOOD PRESSURE: 120 MMHG | HEIGHT: 62 IN

## 2018-09-21 DIAGNOSIS — Z30.017 NEXPLANON INSERTION: Primary | ICD-10-CM

## 2018-09-21 LAB
B-HCG UR QL: NEGATIVE
CTP QC/QA: YES

## 2018-09-21 PROCEDURE — 11981 INSERTION DRUG DLVR IMPLANT: CPT | Mod: PBBFAC,PO | Performed by: OBSTETRICS & GYNECOLOGY

## 2018-09-21 PROCEDURE — 81025 URINE PREGNANCY TEST: CPT | Mod: PBBFAC,PO | Performed by: OBSTETRICS & GYNECOLOGY

## 2018-09-21 RX ORDER — OXYBUTYNIN CHLORIDE 5 MG/1
5 TABLET, EXTENDED RELEASE ORAL DAILY
COMMUNITY
Start: 2018-09-06

## 2018-09-21 NOTE — PROCEDURES
Insertion of Nexplanon Device  Date/Time: 9/21/2018 2:59 PM  Performed by: VICENTE Paulino MD  Authorized by: VICENTE Paulino MD   Local anesthesia used: yes    Anesthesia:  Local anesthesia used: yes  Local Anesthetic: lidocaine 1% without epinephrine  Anesthetic total: 5 mL    Sedation:  Patient sedated: no    Patient tolerance: Patient tolerated the procedure well with no immediate complications          Patient was counseled on the risks of the procedure including pain, bleeding, and infection.  Patient acknowledged risks, and all questions were answered.    Insertion site was prepped with povidine iodine and injected with 3-cc of lidocaine.  Implant was inserted with the 's provided device.  Patient palpated the device.  Site bandaged and wrapped.  Patient instructed to keep bandage on for 24 hours.    Arm:  LEFT  Lot #:  [id number]  Exp. Date: 1/2021    Pain, bleeding, and infection precautions given.

## 2018-12-17 ENCOUNTER — OFFICE VISIT (OUTPATIENT)
Dept: OBSTETRICS AND GYNECOLOGY | Facility: CLINIC | Age: 28
End: 2018-12-17
Payer: MEDICAID

## 2018-12-17 VITALS
WEIGHT: 173.94 LBS | BODY MASS INDEX: 32.01 KG/M2 | HEIGHT: 62 IN | SYSTOLIC BLOOD PRESSURE: 106 MMHG | DIASTOLIC BLOOD PRESSURE: 80 MMHG

## 2018-12-17 DIAGNOSIS — N89.8 VAGINAL DISCHARGE: Primary | ICD-10-CM

## 2018-12-17 DIAGNOSIS — Z20.2 POSSIBLE EXPOSURE TO STD: ICD-10-CM

## 2018-12-17 PROCEDURE — 99213 OFFICE O/P EST LOW 20 MIN: CPT | Mod: S$PBB,,, | Performed by: OBSTETRICS & GYNECOLOGY

## 2018-12-17 PROCEDURE — 87591 N.GONORRHOEAE DNA AMP PROB: CPT

## 2018-12-17 PROCEDURE — 99999 PR PBB SHADOW E&M-EST. PATIENT-LVL III: CPT | Mod: PBBFAC,,, | Performed by: OBSTETRICS & GYNECOLOGY

## 2018-12-17 PROCEDURE — 87491 CHLMYD TRACH DNA AMP PROBE: CPT

## 2018-12-17 PROCEDURE — 99213 OFFICE O/P EST LOW 20 MIN: CPT | Mod: PBBFAC | Performed by: OBSTETRICS & GYNECOLOGY

## 2018-12-17 PROCEDURE — 87660 TRICHOMONAS VAGIN DIR PROBE: CPT

## 2018-12-17 RX ORDER — FLUCONAZOLE 150 MG/1
150 TABLET ORAL ONCE
Qty: 1 TABLET | Refills: 0 | Status: SHIPPED | OUTPATIENT
Start: 2018-12-17 | End: 2018-12-17

## 2018-12-17 NOTE — PROGRESS NOTES
"Chief Complaint   Patient presents with    Pelvic Pain    Vaginal Discharge       HPI:  28 y.o. female     No LMP recorded. Patient has had an implant.    - VAGINAL DISCHARGE WITH IRRITATION X1 MONTH  - OCCASIONAL CRAMPS  - NO BLEEDING OR DYSURIA    Contraception: 2018, NEXPLANON  Pap: ASC-H pap at outside facility --> 3/2016: Bx=CIN1 at [facility] --> 7/10/2018, LSIL --> 2018: ECC=(-)    Past Medical History:   Diagnosis Date    Abnormal Pap smear of cervix     Urinary tract infection     Vaginal infection      Past Surgical History:   Procedure Laterality Date    scoliosis sx         Social History     Tobacco Use    Smoking status: Never Smoker    Smokeless tobacco: Never Used   Substance Use Topics    Alcohol use: Yes     Family History   Problem Relation Age of Onset    Kidney disease Neg Hx      OB History    Para Term  AB Living   2       1     SAB TAB Ectopic Multiple Live Births         0        # Outcome Date GA Lbr Evelio/2nd Weight Sex Delivery Anes PTL Lv   2             1 AB                   MEDICATIONS: Reviewed with patient.  ALLERGIES: Patient has no known allergies.     ROS:  Review of Systems   Constitutional: Negative for fever.   Respiratory: Negative for shortness of breath.    Cardiovascular: Negative for chest pain.   Gastrointestinal: Negative for abdominal pain, nausea and vomiting.   Genitourinary: Positive for pelvic pain and vaginal discharge. Negative for dysuria, menstrual problem and vaginal bleeding.   Neurological: Negative for headaches.       PHYSICAL EXAM:    /80   Ht 5' 2" (1.575 m)   Wt 78.9 kg (173 lb 15.1 oz)   BMI 31.81 kg/m²     Physical Exam:   Constitutional: She is oriented to person, place, and time. She appears well-developed.    HENT:   Head: Normocephalic.       Pulmonary/Chest: Effort normal.        Abdominal: She exhibits no mass. There is no hepatosplenomegaly. There is no tenderness. No hernia.     Genitourinary: " Uterus is not enlarged and not tender. Cervix is normal. Right adnexum displays no tenderness and no fullness. Left adnexum displays no tenderness and no fullness. Vaginal discharge found.   Genitourinary Comments: External genitalia: Normal  Urethra: No tenderness; normal meatus  Bladder: No tenderness               Neurological: She is alert and oriented to person, place, and time.     Psychiatric: She has a normal mood and affect.         ASSESSMENT & PLAN:   Vaginal discharge  -     C. trachomatis/N. gonorrhoeae by AMP DNA  -     Vaginosis Screen by DNA Probe  -     fluconazole (DIFLUCAN) 150 MG Tab; Take 1 tablet (150 mg total) by mouth once. for 1 dose  Dispense: 1 tablet; Refill: 0    Possible exposure to STD  -     C. trachomatis/N. gonorrhoeae by AMP DNA        - VAGINAL DISCHARGE CONSISTENT WITH YEAST  - NO SIGNIFICANT TENDERNESS ON EXAM  - LABS AS ABOVE  - WILL TREAT EMPIRICALLY WITH FLUCONAZOLE

## 2018-12-18 LAB
CANDIDA RRNA VAG QL PROBE: POSITIVE
G VAGINALIS RRNA GENITAL QL PROBE: POSITIVE
T VAGINALIS RRNA GENITAL QL PROBE: NEGATIVE

## 2018-12-19 ENCOUNTER — PATIENT MESSAGE (OUTPATIENT)
Dept: OBSTETRICS AND GYNECOLOGY | Facility: CLINIC | Age: 28
End: 2018-12-19

## 2018-12-19 DIAGNOSIS — N76.0 ACUTE VAGINITIS: Primary | ICD-10-CM

## 2018-12-19 RX ORDER — METRONIDAZOLE 500 MG/1
500 TABLET ORAL EVERY 12 HOURS
Qty: 14 TABLET | Refills: 0 | Status: SHIPPED | OUTPATIENT
Start: 2018-12-19 | End: 2018-12-26

## 2018-12-20 LAB
C TRACH DNA SPEC QL NAA+PROBE: NOT DETECTED
N GONORRHOEA DNA SPEC QL NAA+PROBE: NOT DETECTED

## 2019-06-30 ENCOUNTER — HOSPITAL ENCOUNTER (EMERGENCY)
Facility: HOSPITAL | Age: 29
Discharge: HOME | End: 2019-06-30
Attending: EMERGENCY MEDICINE
Payer: MEDICAID

## 2019-06-30 VITALS
HEART RATE: 83 BPM | DIASTOLIC BLOOD PRESSURE: 59 MMHG | OXYGEN SATURATION: 98 % | BODY MASS INDEX: 32.1 KG/M2 | SYSTOLIC BLOOD PRESSURE: 120 MMHG | RESPIRATION RATE: 16 BRPM | HEIGHT: 61 IN | WEIGHT: 170 LBS | TEMPERATURE: 99 F
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
## 2019-06-30 DIAGNOSIS — S06.0X1A CONCUSSION WITH LOSS OF CONSCIOUSNESS OF 30 MINUTES OR LESS, INITIAL ENCOUNTER: Primary | ICD-10-CM

## 2019-06-30 PROCEDURE — 99284 EMERGENCY DEPT VISIT MOD MDM: CPT | Mod: ER

## 2019-06-30 PROCEDURE — 25000003 PHARM REV CODE 250: Mod: ER | Performed by: EMERGENCY MEDICINE

## 2019-06-30 RX ORDER — BUTALBITAL, ACETAMINOPHEN AND CAFFEINE 50; 325; 40 MG/1; MG/1; MG/1
2 TABLET ORAL
Status: COMPLETED | OUTPATIENT
Start: 2019-06-30 | End: 2019-06-30

## 2019-06-30 RX ORDER — BUTALBITAL, ACETAMINOPHEN AND CAFFEINE 50; 325; 40 MG/1; MG/1; MG/1
1 TABLET ORAL EVERY 4 HOURS PRN
Qty: 12 TABLET | Refills: 0 | Status: SHIPPED | OUTPATIENT
Start: 2019-06-30 | End: 2019-07-30

## 2019-06-30 RX ADMIN — BUTALBITAL, ACETAMINOPHEN, AND CAFFEINE 2 TABLET: 50; 325; 40 TABLET ORAL at 10:06
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
## 2019-11-08 ENCOUNTER — OFFICE VISIT (OUTPATIENT)
Dept: OBSTETRICS AND GYNECOLOGY | Facility: CLINIC | Age: 29
End: 2019-11-08
Payer: MEDICAID

## 2019-11-08 VITALS
DIASTOLIC BLOOD PRESSURE: 74 MMHG | SYSTOLIC BLOOD PRESSURE: 122 MMHG | BODY MASS INDEX: 34.91 KG/M2 | WEIGHT: 184.75 LBS

## 2019-11-08 DIAGNOSIS — N92.6 IRREGULAR UTERINE BLEEDING: ICD-10-CM

## 2019-11-08 DIAGNOSIS — R87.612 LOW GRADE SQUAMOUS INTRAEPITHELIAL LESION ON CYTOLOGIC SMEAR OF CERVIX (LGSIL): ICD-10-CM

## 2019-11-08 DIAGNOSIS — Z01.419 ENCOUNTER FOR GYNECOLOGICAL EXAMINATION (GENERAL) (ROUTINE) WITHOUT ABNORMAL FINDINGS: Primary | ICD-10-CM

## 2019-11-08 DIAGNOSIS — Z30.46 ENCOUNTER FOR SURVEILLANCE OF IMPLANTABLE SUBDERMAL CONTRACEPTIVE: ICD-10-CM

## 2019-11-08 PROCEDURE — 99395 PREV VISIT EST AGE 18-39: CPT | Mod: S$PBB,,, | Performed by: OBSTETRICS & GYNECOLOGY

## 2019-11-08 PROCEDURE — 88175 CYTOPATH C/V AUTO FLUID REDO: CPT

## 2019-11-08 PROCEDURE — 99999 PR PBB SHADOW E&M-EST. PATIENT-LVL III: CPT | Mod: PBBFAC,,, | Performed by: OBSTETRICS & GYNECOLOGY

## 2019-11-08 PROCEDURE — 99213 OFFICE O/P EST LOW 20 MIN: CPT | Mod: PBBFAC,PO | Performed by: OBSTETRICS & GYNECOLOGY

## 2019-11-08 PROCEDURE — 99395 PR PREVENTIVE VISIT,EST,18-39: ICD-10-PCS | Mod: S$PBB,,, | Performed by: OBSTETRICS & GYNECOLOGY

## 2019-11-08 PROCEDURE — 99999 PR PBB SHADOW E&M-EST. PATIENT-LVL III: ICD-10-PCS | Mod: PBBFAC,,, | Performed by: OBSTETRICS & GYNECOLOGY

## 2019-11-08 RX ORDER — NORGESTIMATE AND ETHINYL ESTRADIOL 0.25-0.035
1 KIT ORAL DAILY
Qty: 28 TABLET | Refills: 1 | Status: SHIPPED | OUTPATIENT
Start: 2019-11-08 | End: 2020-11-06

## 2019-11-08 NOTE — PROGRESS NOTES
Chief Complaint   Patient presents with    Contraception       HPI:  29 y.o. female  presents for a well woman exam    No LMP recorded. Patient has had an implant.    - History of abnormal paps: 2015: [facility] pap at outside facility --> 3/2016: Bx=CIN1 at [facility] --> 7/10/2018, [facility] --> 2018: [facility]=(-)  - Abnormal bleeding: IRREGULAR BLEEDING WITH NEXPLANON.  SKIPS CYCLE, THEN HEAVY, PROLONGED BLEEDING THE NEXT MONTH.  NO BLEEDING CURRENTLY.  - Family history of breast or ovarian cancer: MATERNAL GREAT GRANDMOTHER WITH BREAST CANCER  - Any breast masses, pain, skin changes, or nipple discharge: DENIES      Contraception: NEXPLANON, 2018  Pap: 7/10/2018, AS ABOVE    Past Medical History:   Diagnosis Date    Abnormal Pap smear of cervix     Urinary tract infection     Vaginal infection      Past Surgical History:   Procedure Laterality Date    scoliosis sx         Social History     Tobacco Use    Smoking status: Never Smoker    Smokeless tobacco: Never Used   Substance Use Topics    Alcohol use: Yes     Comment: socially     Family History   Problem Relation Age of Onset    Kidney disease Neg Hx      OB History    Para Term  AB Living   2       1     SAB TAB Ectopic Multiple Live Births         0        # Outcome Date GA Lbr Evelio/2nd Weight Sex Delivery Anes PTL Lv   2             1 AB                MEDICATIONS: Reviewed with patient.  ALLERGIES: Patient has no known allergies.     ROS:  Review of Systems   Constitutional: Negative for fever.   Respiratory: Negative for shortness of breath.    Cardiovascular: Negative for chest pain.   Gastrointestinal: Negative for abdominal pain, nausea and vomiting.   Endocrine: Negative for hot flashes.   Genitourinary: Positive for menstrual problem.   Integumentary:  Negative for breast mass, nipple discharge and breast skin changes.   Neurological: Negative for headaches.   Hematological: Does not bruise/bleed easily.    Psychiatric/Behavioral: Negative for depression.   Breast: Negative for mass, mastodynia, nipple discharge and skin changes      PHYSICAL EXAM:    /74   Wt 83.8 kg (184 lb 11.9 oz)   BMI 34.91 kg/m²     Physical Exam:   Constitutional: She is oriented to person, place, and time. She appears well-developed.   No fever    HENT:   Head: Normocephalic.     Neck: No thyromegaly present.    Cardiovascular: Normal rate.     Pulmonary/Chest: Effort normal. Right breast exhibits no mass, no nipple discharge, no skin change, no tenderness and no swelling. Left breast exhibits no mass, no nipple discharge, no skin change, no tenderness and no swelling. Breasts are symmetrical.        Abdominal: She exhibits no mass. There is no hepatosplenomegaly. There is no tenderness. No hernia.     Genitourinary: Vagina normal. Uterus is not enlarged and not tender. Cervix is normal. Right adnexum displays no tenderness and no fullness. Left adnexum displays no tenderness and no fullness. No vaginal discharge found. Additional cervical findings: pap smear done  Genitourinary Comments: External genitalia: Normal  Urethra: No tenderness; normal meatus  Bladder: No tenderness              Lymphadenopathy:     She has no cervical adenopathy.     She has no axillary adenopathy.    Neurological: She is alert and oriented to person, place, and time.     Psychiatric: She has a normal mood and affect.         ASSESSMENT & PLAN:   Encounter for gynecological examination (general) (routine) without abnormal findings  -     Liquid-based pap smear, screening    Low grade squamous intraepithelial lesion on cytologic smear of cervix (LGSIL)  -     Liquid-based pap smear, screening    Encounter for surveillance of implantable subdermal contraceptive  -     norgestimate-ethinyl estradiol (ORTHO-CYCLEN) 0.25-35 mg-mcg per tablet; Take 1 tablet by mouth once daily.  Dispense: 28 tablet; Refill: 1    Irregular uterine bleeding  -      norgestimate-ethinyl estradiol (ORTHO-CYCLEN) 0.25-35 mg-mcg per tablet; Take 1 tablet by mouth once daily.  Dispense: 28 tablet; Refill: 1        - Breast and pelvic exam: NORMAL  - Patient was counseled on ASCCP guidelines for cervical cytology screening  - Cervical screening: PAP DONE TODAY    - BLEEDING LIKELY NEXPLANON EFFECT  - WILL TRY SHORT COURSE OF OCPs TO REGULATE  - IF IRREGULAR BLEEDING PERSISTS, REMOVAL MAY BE WARRANTED

## 2019-11-19 ENCOUNTER — PATIENT MESSAGE (OUTPATIENT)
Dept: OBSTETRICS AND GYNECOLOGY | Facility: CLINIC | Age: 29
End: 2019-11-19

## 2019-12-11 ENCOUNTER — PATIENT MESSAGE (OUTPATIENT)
Dept: OBSTETRICS AND GYNECOLOGY | Facility: CLINIC | Age: 29
End: 2019-12-11